# Patient Record
Sex: MALE | Race: WHITE
[De-identification: names, ages, dates, MRNs, and addresses within clinical notes are randomized per-mention and may not be internally consistent; named-entity substitution may affect disease eponyms.]

---

## 2018-08-26 ENCOUNTER — HOSPITAL ENCOUNTER (EMERGENCY)
Dept: HOSPITAL 62 - ER | Age: 23
Discharge: HOME | End: 2018-08-26
Payer: OTHER GOVERNMENT

## 2018-08-26 VITALS — DIASTOLIC BLOOD PRESSURE: 60 MMHG | SYSTOLIC BLOOD PRESSURE: 132 MMHG

## 2018-08-26 DIAGNOSIS — R11.2: ICD-10-CM
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
## 2018-08-26 DIAGNOSIS — S61.012A: Primary | ICD-10-CM

## 2018-08-26 PROCEDURE — 96372 THER/PROPH/DIAG INJ SC/IM: CPT

## 2018-08-26 PROCEDURE — 99282 EMERGENCY DEPT VISIT SF MDM: CPT

## 2018-08-26 PROCEDURE — 12001 RPR S/N/AX/GEN/TRNK 2.5CM/<: CPT

## 2018-08-26 PROCEDURE — S0119 ONDANSETRON 4 MG: HCPCS

## 2018-08-26 NOTE — ER DOCUMENT REPORT
HPI





- HPI


Pain Level: 1


Notes: 





Patient is a 22-year-old male with no significant past medical history who 

presents to the ED complaining of a laceration to his left posterior thumb 

after reaching into his trashcan today.  Patient states that he is still able 

to move his thumb through range of motion without difficulties.  Patient states 

that the blood has soaked her a couple paper towels that he tried putting over 

it.  Patient states that he washed it immediately.  Patient states that he has 

not had any numbness or tingling associated.  Denies any drug allergies.  

Patient states that he is part the  and has all the shots up-to-date 

that he is aware of and believes that he had a tetanus when he was 17 or 18 

years old.  Patient states that he is going to go to his [facility] to confirm this 

tomorrow.  Denies any headache, fever, chest pain, palpitations, syncope, cough

, shortness of breath, wheeze, dyspnea, abdominal pain, nausea/vomiting/diarrhea

, numbness/tingling, muscle paralysis/weakness, or rash.





- ROS


Systems Reviewed and Negative: Yes All other systems reviewed and negative





Past Medical History





- Social History


Smoking Status: Never Smoker


Family History: Reviewed & Not Pertinent





Vertical Provider Document





- CONSTITUTIONAL


Agree With Documented VS: Yes


Notes: 





PHYSICAL EXAMINATION:





GENERAL: Well-appearing, well-nourished and in no acute distress.





LUNGS: Breath sounds clear to auscultation bilaterally and equal.  No wheezes 

rales or rhonchi.





HEART: Regular rate and rhythm without murmurs, rubs, gallops.





Musculoskeletal: Left thumb:  FROM to passive/active. Strength 5+/5. No bony 

tenderness or deformity.  N/v intact distal.





Extremities:  No cyanosis, clubbing, or edema b/l.  Peripheral pulses 2+.  

Capillary refill less than 3 seconds.





NEUROLOGICAL: Normal speech, normal gait.  Normal sensory, motor exams 





PSYCH: Normal mood, normal affect.





SKIN: Lt posterior mid thumb:  there is a 2cm irregular superficial laceration 

noted.  Minimal active bleeding currently.  No injury to nail.  





Course





- Re-evaluation


Re-evalutation: 





08/26/18 14:27


Patient is an afebrile, well-hydrated, 22-year-old male who presents to the ED 

with a superficial skin laceration to the left posterior thumb.  Vitals are 

acceptable without any significant tachycardia, tachypnea, or hypoxia.  PE is 

otherwise unremarkable for any neurovascular compromise, obvious tendon/

ligament rupture, obvious fracture/dislocation, septic joint, retained foreign 

body.  Tetanus was reported to be up-to-date, but patient will confirm with his 

[facility] tomorrow.  Phenergan was given IM for nausea as well as Zofran.  Patient 

did have one episode of vomiting since Zofran was given because he looked at 

his wound.  Patient is feeling much better after vomiting and having the IM 

Phenergan.  He is tolerating p.o. without difficulties.  Patient is nontoxic-

appearing.  Wound was thoroughly irrigated and cleansed.  Wound edges were 

approximated appropriately utilizing 4 simple sutures. Procedure performed by JILLIAN BERNSTEIN under supervision. Wound dressing was applied and a splint.  No 

labs or imaging warranted at this time based on H&P.  Sutures will need removed 

in approximately 10 days.  We will send him home with a prescription for 

Keflex.  Conservative measures for symptoms otherwise.  Recheck with your PCM 

in 2-3 days.  Return to the ED with any worsening/concerning symptoms otherwise 

as reviewed in discharge.  Patient is in agreement.





Procedures





- Laceration/Wound Repair


  ** Left Thumb


Time completed: 14:25


Wound length (cm): 2


Wound's Depth, Shape: Superficial, Irregular


Laceration pre-procedure: Sterile PPE donned, Sterile drapes applied, Other - 

chlorhexadine/saline


Anesthetic type: 1% Lidocaine


Volume Anesthetic (mLs): 4


Wound explored: Clean, No foreign body removed


Irrigated w/ Saline (mLs): 120


Wound Debrided: none


Wound Repaired With: Sutures


Suture Size/Type: 5:0, Nylon


Number of Sutures: 4


Layer Closure?: No


Post-procedure wound care: Sterile dressing applied, Splint applied


Post-procedure NV exam normal: Yes


Complications: No





Discharge





- Discharge


Clinical Impression: 


Thumb laceration


Qualifiers:


 Encounter type: initial encounter Damage to nail status: without damage 

Foreign body presence: without foreign body Laterality: left Qualified Code(s): 

S61.012A - Laceration without foreign body of left thumb without damage to nail

, initial encounter





Condition: Stable


Disposition: HOME, SELF-CARE


Instructions:  Antibiotic Ointment Protection ([facility]), Laceration Care ([facility]), 

Prophylactic Antibiotic ([facility]), Soap Cleansing ([facility])


Additional Instructions: 


Do not shower or bathe for 24 hours.  After 24 hours you may shower but no 

submersion of the wound under water.  Keep the original dressing on the wound 

for 24 hours unless the drainage soaks through.  Change the dressing daily 

thereafter and keep the knots of the suture material clean from any dried 

discharge.  You may leave the wound open to the air once there is no more 

discharge.  Return to the ED and/or your PCM in 2-3 days for a recheck.  

Monitor for any signs of worsening pain or redness, purulent drainage, streaks, 

and/or fever.  Return to the ED if noticing any of the above symptoms or as 

needed.  Take medications as directed.  Your sutures will need to be removed in 

about 10 days.


Prescriptions: 


Cephalexin Monohydrate [Keflex 500 mg Capsule] 500 mg PO TID #21 capsule


Forms:  Elevated Blood Pressure


Referrals: 


[facility] [Provider Group] - Follow up as needed